# Patient Record
Sex: FEMALE | Race: WHITE | NOT HISPANIC OR LATINO | URBAN - METROPOLITAN AREA
[De-identification: names, ages, dates, MRNs, and addresses within clinical notes are randomized per-mention and may not be internally consistent; named-entity substitution may affect disease eponyms.]

---

## 2023-01-06 ENCOUNTER — TELEPHONE (OUTPATIENT)
Dept: UROLOGY | Facility: AMBULATORY SURGERY CENTER | Age: 73
End: 2023-01-06

## 2023-01-06 NOTE — TELEPHONE ENCOUNTER
What is the reason for the patient’s appointment? NP- Bladder Cancer F/U    Patient can be reached at [phone]    What office location does the patient prefer? [place]     Do we accept the patient's insurance or is the patient Self-Pay? Jen Riley    Has the patient had any previous Urologist(s)? [facility], Dr Vanessa Cote    Have patient records been requested? Yes    Has the patient had any outside testing done? No    Does the patient have a personal history of cancer?  Bladder Cancer, Pre cancer of vulva removed 10 years ago

## 2023-01-20 RX ORDER — CHOLESTYRAMINE 4 G/9G
POWDER, FOR SUSPENSION ORAL
COMMUNITY
Start: 2022-11-27

## 2023-01-20 RX ORDER — HYDROCHLOROTHIAZIDE 25 MG/1
25 TABLET ORAL DAILY
COMMUNITY
Start: 2022-12-24

## 2023-01-20 RX ORDER — PYRIDOXINE HCL (VITAMIN B6) 100 MG
100 TABLET ORAL
COMMUNITY

## 2023-01-20 RX ORDER — FENOFIBRATE 160 MG/1
160 TABLET ORAL
COMMUNITY

## 2023-01-20 RX ORDER — LOPERAMIDE HYDROCHLORIDE 2 MG/1
1 TABLET ORAL
COMMUNITY

## 2023-01-20 RX ORDER — ALBUTEROL SULFATE 90 UG/1
2 AEROSOL, METERED RESPIRATORY (INHALATION)
COMMUNITY

## 2023-01-20 RX ORDER — MONTELUKAST SODIUM 10 MG/1
10 TABLET ORAL DAILY
COMMUNITY
Start: 2022-11-07

## 2023-01-20 RX ORDER — BIMATOPROST 0.01 %
1 DROPS OPHTHALMIC (EYE)
COMMUNITY
Start: 2022-12-12

## 2023-01-20 RX ORDER — ESTRADIOL 10 UG/1
TABLET VAGINAL
COMMUNITY
Start: 2023-01-05

## 2023-01-20 RX ORDER — ACETAMINOPHEN 500 MG
1000 TABLET ORAL
COMMUNITY

## 2023-01-20 RX ORDER — DICYCLOMINE HYDROCHLORIDE 10 MG/1
10 CAPSULE ORAL
COMMUNITY

## 2023-01-20 RX ORDER — METOPROLOL TARTRATE 50 MG/1
50 TABLET, FILM COATED ORAL 2 TIMES DAILY
COMMUNITY
Start: 2022-12-06

## 2023-01-20 RX ORDER — MONTELUKAST SODIUM 10 MG/1
10 TABLET ORAL
COMMUNITY

## 2023-01-20 RX ORDER — THIAMINE HCL 100 MG
TABLET ORAL EVERY 24 HOURS
COMMUNITY

## 2023-01-20 RX ORDER — MULTIVITAMIN WITH IRON
250 TABLET ORAL
COMMUNITY

## 2023-01-23 PROBLEM — Z85.51 HISTORY OF BLADDER CANCER: Status: ACTIVE | Noted: 2023-01-23

## 2023-01-24 ENCOUNTER — OFFICE VISIT (OUTPATIENT)
Dept: UROLOGY | Facility: CLINIC | Age: 73
End: 2023-01-24

## 2023-01-24 VITALS
HEIGHT: 64 IN | TEMPERATURE: 97.4 F | BODY MASS INDEX: 30.19 KG/M2 | WEIGHT: 176.8 LBS | DIASTOLIC BLOOD PRESSURE: 68 MMHG | SYSTOLIC BLOOD PRESSURE: 132 MMHG

## 2023-01-24 DIAGNOSIS — Z85.51 HISTORY OF BLADDER CANCER: Primary | ICD-10-CM

## 2023-01-24 NOTE — PATIENT INSTRUCTIONS
Please return for surveillance cystoscopy in the office under local anesthetic sometime within the next few weeks    Thanks

## 2023-01-24 NOTE — PROGRESS NOTES
UROLOGY PROGRESS NOTE         NAME: Rafaela Ramírez  AGE: 81 y o  SEX: female  : 1950   MRN: [id number]    DATE: 2023  TIME: 3:27 PM    Assessment and Plan      Impression:   1  History of bladder cancer       Plan: Patient undergoes surveillance cystoscopy every 6 months  Her last cystoscopy was satisfactory  Follow-up cystoscopy here in office soon  Chief Complaint     Chief Complaint   Patient presents with   • New Patient Visit     Hx bladder cancer- was to get a scope in December and missed due to weather  She wishes to proceed with scope  History of Present Illness     HPI: Rafaela Ramírez is a 81y o  year old female who presents with history of superficial bladder cancer  This is managed endoscopically  No recurrences thus far  We do not have full records from [facility] in [place]  She has been doing well  Surveillance cystoscopy 6 months ago was satisfactory  Patient's been doing well  She is a bit overdue for her surveillance cystoscopy  No gross hematuria no voiding issues presently  The following portions of the patient's history were reviewed and updated as appropriate: allergies, current medications, past family history, past medical history, past social history, past surgical history and problem list   Past Medical History:   Diagnosis Date   • Bladder cancer ([facility] [address] )    • COPD (chronic obstructive pulmonary disease) ([facility] )    • Glaucoma    • Hypertension      History reviewed  No pertinent surgical history  shoulder  Review of Systems     Const: Denies chills, fever and weight loss  CV: Denies chest pain  Resp: Denies SOB  GI: Denies abdominal pain, nausea and vomiting  : Denies symptoms other than stated above  Musculo: Denies back pain      Objective   /68 (BP Location: Left arm, Patient Position: Sitting, Cuff Size: Standard)   Temp (!) 97 4 °F (36 3 °C)   Ht 5' 4" (1 626 m)   Wt 80 2 kg (176 lb 12 8 oz)   BMI 30 35 kg/m²     Physical Exam  Const: Appears healthy and well developed  No signs of acute distress present  Resp: Respirations are regular and unlabored  CV: Rate is regular  Rhythm is regular  Abdomen: Abdomen is soft, nontender, and nondistended  Kidneys are not palpable  : Not performed  Psych: Patient's attitude is cooperative   Mood is normal  Affect is normal     Procedure   Procedures     Current Medications     Current Outpatient Medications:   •  acetaminophen (TYLENOL) 500 mg tablet, Take 1,000 mg by mouth, Disp: , Rfl:   •  albuterol (PROVENTIL HFA,VENTOLIN HFA) 90 mcg/act inhaler, Inhale 2 puffs, Disp: , Rfl:   •  dicyclomine (BENTYL) 10 mg capsule, Take 10 mg by mouth Tid, Disp: , Rfl:   •  hydrochlorothiazide (HYDRODIURIL) 25 mg tablet, Take 25 mg by mouth daily, Disp: , Rfl:   •  Lumigan 0 01 % ophthalmic drops, Administer 1 drop to both eyes daily at bedtime, Disp: , Rfl:   •  Magnesium 500 MG TABS, every 24 hours, Disp: , Rfl:   •  metoprolol tartrate (LOPRESSOR) 50 mg tablet, Take 50 mg by mouth 2 (two) times a day, Disp: , Rfl:   •  montelukast (SINGULAIR) 10 mg tablet, Take 10 mg by mouth, Disp: , Rfl:   •  pyridoxine (B-6) 100 MG tablet, Take 100 mg by mouth Twice weekly, Disp: , Rfl:   •  Yuvafem 10 MCG TABS vaginal tablet, Twice weekly, Disp: , Rfl:   •  cholestyramine (QUESTRAN) 4 g packet, DISSOLVE AND TAKE 1 PACKET BY MOUTH MIXED WITH WATER OR NON-CARBONATED DRINK ONCE DAILY FOR 30 DAYS (Patient not taking: Reported on 1/24/2023), Disp: , Rfl:   •  fenofibrate 160 MG tablet, Take 160 mg by mouth (Patient not taking: Reported on 1/24/2023), Disp: , Rfl:   •  loperamide (IMODIUM A-D) 2 MG tablet, Take 1 mg by mouth (Patient not taking: Reported on 1/24/2023), Disp: , Rfl:   •  Magnesium 250 MG TABS, Take 250 mg by mouth (Patient not taking: Reported on 1/24/2023), Disp: , Rfl:   •  montelukast (SINGULAIR) 10 mg tablet, Take 10 mg by mouth daily (Patient not taking: Reported on 1/24/2023), Disp: , Rfl: Barrington Burgos MD

## 2023-02-27 ENCOUNTER — PROCEDURE VISIT (OUTPATIENT)
Dept: UROLOGY | Facility: CLINIC | Age: 73
End: 2023-02-27

## 2023-02-27 VITALS — WEIGHT: 177 LBS | TEMPERATURE: 97.8 F | BODY MASS INDEX: 30.22 KG/M2 | HEIGHT: 64 IN

## 2023-02-27 DIAGNOSIS — Z85.51 HISTORY OF BLADDER CANCER: Primary | ICD-10-CM

## 2023-02-27 LAB
SL AMB  POCT GLUCOSE, UA: ABNORMAL
SL AMB LEUKOCYTE ESTERASE,UA: ABNORMAL
SL AMB POCT BILIRUBIN,UA: ABNORMAL
SL AMB POCT BLOOD,UA: 1
SL AMB POCT CLARITY,UA: CLEAR
SL AMB POCT COLOR,UA: YELLOW
SL AMB POCT KETONES,UA: ABNORMAL
SL AMB POCT NITRITE,UA: ABNORMAL
SL AMB POCT PH,UA: 5
SL AMB POCT SPECIFIC GRAVITY,UA: 1.02
SL AMB POCT URINE PROTEIN: ABNORMAL
SL AMB POCT UROBILINOGEN: 0.2

## 2023-02-27 RX ORDER — DICYCLOMINE HCL 20 MG
20 TABLET ORAL 2 TIMES DAILY
COMMUNITY
Start: 2023-02-06

## 2023-02-27 NOTE — PROGRESS NOTES
UROLOGY PROGRESS NOTE         NAME: Rafaela Ramírez  AGE: 81 y o  SEX: female  : 1950   MRN: [id number]    DATE: 2023  TIME: 1:57 PM    Assessment and Plan      Impression:   1  History of bladder cancer  -     Cystoscopy       Plan: She and underwent flexible cystoscopy today for follow-up of her history of bladder cancer  Her cystoscopy today was normal   We will see her back in 6 months for surveillance cystoscopy in the office      Chief Complaint     Chief Complaint   Patient presents with   • Cystoscopy     History of Present Illness     HPI: Rafaela Ramírez is a 81y o  year old female who presents with history of bladder cancer here for surveillance cystoscopy  She has been doing well and denies any blood in her urine  No lower urinary tract symptoms  The following portions of the patient's history were reviewed and updated as appropriate: allergies, current medications, past family history, past medical history, past social history, past surgical history and problem list   Past Medical History:   Diagnosis Date   • Bladder cancer ([facility] )    • Cancer ([facility] )    • COPD (chronic obstructive pulmonary disease) ([place] [address] )    • Glaucoma    • Hypertension    • Kidney stone    • Urinary incontinence      Past Surgical History:   Procedure Laterality Date   • APPENDECTOMY     • BLADDER SURGERY     • HERNIA REPAIR     • HYSTERECTOMY     • KIDNEY STONE SURGERY     • LITHOTRIPSY     • TUBAL LIGATION       shoulder  Review of Systems     Const: Denies chills, fever and weight loss  CV: Denies chest pain  Resp: Denies SOB  GI: Denies abdominal pain, nausea and vomiting  : Denies symptoms other than stated above  Musculo: Denies back pain      Objective   BP (P) 140/90 (BP Location: Left arm, Patient Position: Sitting, Cuff Size: Large)   Pulse (P) 66   Temp 97 8 °F (36 6 °C)   Ht 5' 4" (1 626 m)   Wt 80 3 kg (177 lb)   SpO2 (P) 90%   BMI 30 38 kg/m²     Physical Exam  Const: Appears healthy and well developed  No signs of acute distress present  Resp: Respirations are regular and unlabored  CV: Rate is regular  Rhythm is regular  Abdomen: Abdomen is soft, nontender, and nondistended  Kidneys are not palpable  : External genitalia normal   Psych: Patient's attitude is cooperative  Mood is normal  Affect is normal     Procedure      Cystoscopy     Date/Time 2/27/2023 1:55 PM     Performed by  Casey Roberts MD     Authorized by Casey Roberts MD      Universal Protocol:  Consent: Verbal consent obtained  Written consent obtained  Risks and benefits: risks, benefits and alternatives were discussed  Consent given by: patient  Patient understanding: patient states understanding of the procedure being performed  Patient consent: the patient's understanding of the procedure matches consent given  Procedure consent: procedure consent matches procedure scheduled  Relevant documents: relevant documents present and verified  Patient identity confirmed: verbally with patient        Procedure Details:  Procedure type: cystoscopy    Patient tolerance: Patient tolerated the procedure well with no immediate complications    Additional Procedure Details: Patient was brought to the cystoscopy suite identified and a timeout was satisfactory  She was placed in the lithotomy position the vagina was prepped and draped in usual sterile fashion using Betadine  Lidocaine jelly was instilled per urethra  Flexible cystoscopy was performed the urethra was normal the interior of the bladder was normal   There were no bladder tumors stones or diverticula the scope was removed from the patient  She tolerated the procedure well and there were no immediate problems  She was discharged from the office in satisfactory condition we will see her back in 6 months for surveillance cystoscopy    We sent an antibiotic to her pharmacy to cover her for the procedure today       Current Medications Current Outpatient Medications:   •  acetaminophen (TYLENOL) 500 mg tablet, Take 1,000 mg by mouth, Disp: , Rfl:   •  albuterol (PROVENTIL HFA,VENTOLIN HFA) 90 mcg/act inhaler, Inhale 2 puffs, Disp: , Rfl:   •  cholestyramine (QUESTRAN) 4 g packet, , Disp: , Rfl:   •  hydrochlorothiazide (HYDRODIURIL) 25 mg tablet, Take 25 mg by mouth daily, Disp: , Rfl:   •  loperamide (IMODIUM A-D) 2 MG tablet, Take 1 mg by mouth, Disp: , Rfl:   •  Lumigan 0 01 % ophthalmic drops, Administer 1 drop to both eyes daily at bedtime, Disp: , Rfl:   •  Magnesium 500 MG TABS, every 24 hours, Disp: , Rfl:   •  metoprolol tartrate (LOPRESSOR) 50 mg tablet, Take 50 mg by mouth 2 (two) times a day, Disp: , Rfl:   •  montelukast (SINGULAIR) 10 mg tablet, Take 10 mg by mouth daily, Disp: , Rfl:   •  pyridoxine (B-6) 100 MG tablet, Take 100 mg by mouth Twice weekly, Disp: , Rfl:   •  Yuvafem 10 MCG TABS vaginal tablet, Twice weekly, Disp: , Rfl:   •  dicyclomine (BENTYL) 20 mg tablet, Take 20 mg by mouth 2 (two) times a day, Disp: , Rfl:         Maida Terrell MD

## 2023-02-27 NOTE — PATIENT INSTRUCTIONS
Please follow-up for surveillance cystoscopy in the office in 6 months     your antibiotic at your pharmacy

## 2023-03-07 DIAGNOSIS — Z85.51 HISTORY OF BLADDER CANCER: Primary | ICD-10-CM

## 2023-03-07 RX ORDER — CEPHALEXIN 500 MG/1
500 CAPSULE ORAL EVERY 12 HOURS SCHEDULED
Qty: 14 CAPSULE | Refills: 0 | Status: SHIPPED | OUTPATIENT
Start: 2023-03-07 | End: 2023-03-08

## 2023-09-28 RX ORDER — CIPROFLOXACIN 500 MG/1
TABLET, FILM COATED ORAL EVERY 12 HOURS
COMMUNITY
Start: 2023-09-21

## 2023-09-28 RX ORDER — METRONIDAZOLE 500 MG/1
TABLET ORAL 3 TIMES DAILY
COMMUNITY
Start: 2023-09-21

## 2023-10-02 ENCOUNTER — PROCEDURE VISIT (OUTPATIENT)
Dept: UROLOGY | Facility: CLINIC | Age: 73
End: 2023-10-02
Payer: MEDICARE

## 2023-10-02 VITALS
DIASTOLIC BLOOD PRESSURE: 80 MMHG | HEIGHT: 64 IN | HEART RATE: 89 BPM | OXYGEN SATURATION: 98 % | TEMPERATURE: 97.6 F | WEIGHT: 176.4 LBS | SYSTOLIC BLOOD PRESSURE: 140 MMHG | BODY MASS INDEX: 30.11 KG/M2

## 2023-10-02 DIAGNOSIS — Z85.51 HISTORY OF BLADDER CANCER: Primary | ICD-10-CM

## 2023-10-02 LAB
SL AMB  POCT GLUCOSE, UA: ABNORMAL
SL AMB LEUKOCYTE ESTERASE,UA: ABNORMAL
SL AMB POCT BILIRUBIN,UA: ABNORMAL
SL AMB POCT BLOOD,UA: ABNORMAL
SL AMB POCT CLARITY,UA: CLEAR
SL AMB POCT COLOR,UA: YELLOW
SL AMB POCT KETONES,UA: ABNORMAL
SL AMB POCT NITRITE,UA: ABNORMAL
SL AMB POCT PH,UA: 5.5
SL AMB POCT SPECIFIC GRAVITY,UA: 1.03
SL AMB POCT URINE PROTEIN: ABNORMAL
SL AMB POCT UROBILINOGEN: 0.2

## 2023-10-02 PROCEDURE — 81003 URINALYSIS AUTO W/O SCOPE: CPT | Performed by: UROLOGY

## 2023-10-02 PROCEDURE — 52000 CYSTOURETHROSCOPY: CPT | Performed by: UROLOGY

## 2023-10-02 NOTE — PROGRESS NOTES
Cystoscopy     Date/Time 10/2/2023 2:30 PM     Performed by  Maya Alanis MD   Authorized by Maya Alanis MD     Universal Protocol:  Consent: Verbal consent obtained. Written consent obtained. Risks and benefits: risks, benefits and alternatives were discussed  Consent given by: patient  Patient understanding: patient states understanding of the procedure being performed  Patient consent: the patient's understanding of the procedure matches consent given  Procedure consent: procedure consent matches procedure scheduled  Relevant documents: relevant documents present and verified  Test results: test results available and properly labeled  Site marked: the operative site was marked  Patient identity confirmed: verbally with patient        Procedure Details:  Procedure type: cystoscopy    Patient tolerance: Patient tolerated the procedure well with no immediate complications    Additional Procedure Details: Patient brought to the cystoscopy room identified and a timeout was satisfactory. Patient placed in lithotomy position vagina prepped and draped in usual sterile fashion using Betadine. Flexible cystoscopy performed. Urethra normal bladder normal.  No tumors ureteral orifice ease normal both effluxed clear urine. Scope was retroflexed and bladder outlet appears normal.  Scope then removed. Patient is presently on Cipro and Flagyl for diverticulitis. This is resolving. No need for any additional antibiotic I will see her back in 6 months for continued surveillance with cystoscopy. She will call if there are any issues in the interim.

## 2024-05-29 RX ORDER — CYCLOBENZAPRINE HCL 10 MG
10 TABLET ORAL 3 TIMES DAILY PRN
COMMUNITY
Start: 2024-05-14

## 2024-05-30 ENCOUNTER — PROCEDURE VISIT (OUTPATIENT)
Dept: UROLOGY | Facility: CLINIC | Age: 74
End: 2024-05-30
Payer: MEDICARE

## 2024-05-30 VITALS
OXYGEN SATURATION: 98 % | SYSTOLIC BLOOD PRESSURE: 132 MMHG | BODY MASS INDEX: 27.59 KG/M2 | HEIGHT: 64 IN | TEMPERATURE: 98.2 F | RESPIRATION RATE: 20 BRPM | HEART RATE: 80 BPM | WEIGHT: 161.6 LBS | DIASTOLIC BLOOD PRESSURE: 70 MMHG

## 2024-05-30 DIAGNOSIS — Z85.51 HISTORY OF BLADDER CANCER: Primary | ICD-10-CM

## 2024-05-30 LAB
SL AMB  POCT GLUCOSE, UA: ABNORMAL
SL AMB LEUKOCYTE ESTERASE,UA: ABNORMAL
SL AMB POCT BILIRUBIN,UA: ABNORMAL
SL AMB POCT BLOOD,UA: ABNORMAL
SL AMB POCT CLARITY,UA: CLEAR
SL AMB POCT COLOR,UA: YELLOW
SL AMB POCT KETONES,UA: ABNORMAL
SL AMB POCT NITRITE,UA: ABNORMAL
SL AMB POCT PH,UA: 5.5
SL AMB POCT SPECIFIC GRAVITY,UA: 1.01
SL AMB POCT URINE PROTEIN: ABNORMAL
SL AMB POCT UROBILINOGEN: 0.2

## 2024-05-30 PROCEDURE — 81003 URINALYSIS AUTO W/O SCOPE: CPT | Performed by: UROLOGY

## 2024-05-30 PROCEDURE — 52000 CYSTOURETHROSCOPY: CPT | Performed by: UROLOGY

## 2024-05-30 NOTE — PROGRESS NOTES
Cystoscopy     Date/Time  5/30/2024 2:30 PM     Performed by  Alejandro Osuna MD   Authorized by  Alejandro Osuna MD     Universal Protocol:  Consent: Verbal consent obtained. Written consent obtained.  Risks and benefits: risks, benefits and alternatives were discussed  Consent given by: patient  Patient understanding: patient states understanding of the procedure being performed  Patient consent: the patient's understanding of the procedure matches consent given  Procedure consent: procedure consent matches procedure scheduled  Relevant documents: relevant documents present and verified  Patient identity confirmed: verbally with patient      Procedure Details:  Procedure type: cystoscopy    Patient tolerance: Patient tolerated the procedure well with no immediate complications    Additional Procedure Details: Patient with prior history of superficial bladder cancer.  Here for surveillance cystoscopy.  No gross hematuria.  No UTIs.    Patient was brought to cystoscopy suite identified timeout was satisfactory.  Informed consent have been sent obtained.    Urethra prepped and draped in usual sterile fashion using Betadine in the lithotomy position.  Lidocaine jelly instilled.  Flexible cystoscopy carried out.  Urethra normal.  Bladder normal.  No bladder tumors stones or diverticula seen.  Right and left ureteral orifice effluxed clear urine and are normal.  Scope was retroflexed and bladder outlet is normal.  Scope was then removed.  Patient tolerated the procedure well and received a single dose of Cipro 500 mg today p.o.  Surveillance cystoscopy at an appropriate interval ordered.

## 2025-04-07 ENCOUNTER — TELEPHONE (OUTPATIENT)
Age: 75
End: 2025-04-07

## 2025-04-07 NOTE — TELEPHONE ENCOUNTER
"Pt under care of:  Enrrique  Office Location:     Insurance   Current Insurance?[employer]   Insurance E-verified? Y      History  Last Seen (include Follow Up recommendations of last visit- see Office Visit - Instructions): 5/30/24 \"Return in about 9 months (around 2/28/2025).\"    Pt calling due to: Schedule Cysto. Parient cancelled prev scheduled appt of 2/25/25    Active Symptoms?  Explain:    Appointment Details   Date:  5/2    Time:  3 PM    Location:      Provider:  Enrrique    Does the appointment need further review? N      Pt can be reached at: [phone]    "